# Patient Record
Sex: MALE | Race: WHITE | NOT HISPANIC OR LATINO | Employment: OTHER | URBAN - METROPOLITAN AREA
[De-identification: names, ages, dates, MRNs, and addresses within clinical notes are randomized per-mention and may not be internally consistent; named-entity substitution may affect disease eponyms.]

---

## 2022-07-27 PROBLEM — N40.0 BPH (BENIGN PROSTATIC HYPERPLASIA): Status: ACTIVE | Noted: 2022-07-27

## 2022-07-27 PROBLEM — E87.1 HYPONATREMIA: Status: ACTIVE | Noted: 2022-07-27

## 2022-07-27 PROBLEM — E87.5 HYPERKALEMIA: Status: ACTIVE | Noted: 2022-07-27

## 2022-10-11 PROBLEM — N39.0 URINARY TRACT INFECTION WITH HEMATURIA: Status: ACTIVE | Noted: 2022-10-11

## 2022-10-11 PROBLEM — E83.51 HYPOCALCEMIA: Status: ACTIVE | Noted: 2022-10-11

## 2022-10-11 PROBLEM — I48.91 ATRIAL FIBRILLATION: Status: ACTIVE | Noted: 2022-10-11

## 2022-10-11 PROBLEM — E83.42 HYPOMAGNESEMIA: Status: ACTIVE | Noted: 2022-10-11

## 2022-10-11 PROBLEM — F32.A DEPRESSION: Status: ACTIVE | Noted: 2022-10-11

## 2022-10-11 PROBLEM — N17.9 AKI (ACUTE KIDNEY INJURY): Status: ACTIVE | Noted: 2022-10-11

## 2022-10-11 PROBLEM — R31.9 URINARY TRACT INFECTION WITH HEMATURIA: Status: ACTIVE | Noted: 2022-10-11

## 2022-10-13 PROBLEM — N32.89 BLADDER SPASM: Status: ACTIVE | Noted: 2022-10-13

## 2022-10-13 PROBLEM — N36.8 BLOODY URETHRAL DISCHARGE: Status: ACTIVE | Noted: 2022-10-13

## 2022-10-13 PROBLEM — E86.0 DEHYDRATION: Status: ACTIVE | Noted: 2022-10-13

## 2022-10-14 PROBLEM — R00.1 BRADYCARDIA: Status: ACTIVE | Noted: 2022-10-14

## 2022-10-15 PROBLEM — N30.01 ACUTE CYSTITIS WITH HEMATURIA: Status: ACTIVE | Noted: 2022-10-15

## 2022-10-15 PROBLEM — T83.511A URINARY TRACT INFECTION ASSOCIATED WITH INDWELLING URETHRAL CATHETER: Status: ACTIVE | Noted: 2022-10-11

## 2022-10-18 ENCOUNTER — PATIENT OUTREACH (OUTPATIENT)
Dept: ADMINISTRATIVE | Facility: CLINIC | Age: 72
End: 2022-10-18

## 2022-10-18 NOTE — PROGRESS NOTES
C3 nurse attempted to contact Kris Olivares  for a [facility] post hospital discharge follow up call. No answer. Left voicemail with callback information. The patient does not have a scheduled HOSFU appointment. Non [facility] PCP

## 2023-01-16 PROBLEM — N17.9 AKI (ACUTE KIDNEY INJURY): Status: RESOLVED | Noted: 2022-10-11 | Resolved: 2023-01-16

## 2023-01-16 PROBLEM — N39.0 URINARY TRACT INFECTION ASSOCIATED WITH INDWELLING URETHRAL CATHETER: Status: RESOLVED | Noted: 2022-10-11 | Resolved: 2023-01-16

## 2023-01-16 PROBLEM — T83.511A URINARY TRACT INFECTION ASSOCIATED WITH INDWELLING URETHRAL CATHETER: Status: RESOLVED | Noted: 2022-10-11 | Resolved: 2023-01-16
